# Patient Record
Sex: FEMALE | NOT HISPANIC OR LATINO
[De-identification: names, ages, dates, MRNs, and addresses within clinical notes are randomized per-mention and may not be internally consistent; named-entity substitution may affect disease eponyms.]

---

## 2022-08-30 ENCOUNTER — DASHBOARD ENCOUNTERS (OUTPATIENT)
Age: 52
End: 2022-08-30

## 2022-08-30 ENCOUNTER — OFFICE VISIT (OUTPATIENT)
Dept: URBAN - METROPOLITAN AREA CLINIC 27 | Facility: CLINIC | Age: 52
End: 2022-08-30
Payer: COMMERCIAL

## 2022-08-30 VITALS
HEIGHT: 64 IN | TEMPERATURE: 97.3 F | HEART RATE: 82 BPM | DIASTOLIC BLOOD PRESSURE: 79 MMHG | WEIGHT: 186 LBS | SYSTOLIC BLOOD PRESSURE: 125 MMHG | BODY MASS INDEX: 31.76 KG/M2

## 2022-08-30 DIAGNOSIS — Z12.11 SCREEN FOR COLON CANCER: ICD-10-CM

## 2022-08-30 PROCEDURE — 99202 OFFICE O/P NEW SF 15 MIN: CPT | Performed by: INTERNAL MEDICINE

## 2022-08-30 NOTE — HPI-TODAY'S VISIT:
Ms. Conklin is a 52 YOF new patient referred by Dr. Pablo for initial screening colonoscopy. She denies change in bowels and rectal bleeding.  FH: no CRC or polyps

## 2022-10-07 ENCOUNTER — OFFICE VISIT (OUTPATIENT)
Dept: URBAN - METROPOLITAN AREA SURGERY CENTER 7 | Facility: SURGERY CENTER | Age: 52
End: 2022-10-07